# Patient Record
(demographics unavailable — no encounter records)

---

## 2024-10-15 NOTE — HISTORY OF PRESENT ILLNESS
[Breast milk] : breast milk [Normal] : Normal [In Bassinet/Crib] : sleeps in bassinet/crib [de-identified] : patient here for wcc [de-identified] : ithcy from eczema  [de-identified] : astart solids  food allergy testing moderate eczema

## 2024-10-15 NOTE — DEVELOPMENTAL MILESTONES
[Normal Development] : Normal Development [None] : none [Pats or smiles at reflection] : pats or smiles at reflection [Begins to turn when name called] : begins to turn when name called [Babbles] : babbles [Rolls over prone to supine] : rolls over prone to supine [Sits briefly without support] : sits briefly without support [Reaches for object and transfers] : reaches for object and transfers [Rakes small object with 4 fingers] : rakes small object with 4 fingers [[place] small object on surface] : bangs small object on surface [Passed] : passed

## 2024-10-15 NOTE — DISCUSSION/SUMMARY
[Normal Growth] : growth [Normal Development] : development [None] : No medical problems [No Elimination Concerns] : elimination [No Feeding Concerns] : feeding [No Skin Concerns] : skin [Normal Sleep Pattern] : sleep [Term Infant] : Term infant [Family Functioning] : family functioning [Nutrition and Feeding] : nutrition and feeding [Infant Development] : infant development [Oral Health] : oral health [Safety] : safety [No Medications] : ~He/She~ is not on any medications [Parent/Guardian] : parent/guardian [] : The components of the vaccine(s) to be administered today are listed in the plan of care. The disease(s) for which the vaccine(s) are intended to prevent and the risks have been discussed with the caretaker.  The risks are also included in the appropriate vaccination information statements which have been provided to the patient's caregiver.  The caregiver has given consent to vaccinate. [FreeTextEntry1] : Recommend breastfeeding, 8-12 feedings per day. If formula is needed, 2-4 oz every 3-4 hrs. Introduce single-ingredient foods rich in iron, one at a time. Incorporate up to 4 oz of flourinated water daily in a sippy cup. When teeth erupt wipe daily with washcloth. When in car, patient should be in rear-facing car seat in back seat. Put baby to sleep on back, in own crib with no loose or soft bedding. Lower crib matress. Help baby to maintain sleep and feeding routines. May offer pacifier if needed. Continue tummy time when awake. Ensure home is safe since baby is now more mobile. Do not use infant walker. Read aloud to baby. vaccines given eczema reviewed food allergy panel done  no rsv available  return fro more vaccines one month and check up three months

## 2024-10-15 NOTE — PHYSICAL EXAM
[Alert] : alert [Normocephalic] : normocephalic [Flat Open Anterior [place]] : flat open anterior fontanelle [Red Reflex] : red reflex bilateral [PERRL] : PERRL [Normally Placed Ears] : normally placed ears [Auricles Well Formed] : auricles well formed [Clear Tympanic membranes] : clear tympanic membranes [Light reflex present] : light reflex present [Bony landmarks visible] : bony landmarks visible [Nares Patent] : nares patent [Palate Intact] : palate intact [Uvula Midline] : uvula midline [Supple, full passive range of motion] : supple, full passive range of motion [Symmetric Chest Rise] : symmetric chest rise [Clear to Auscultation Bilaterally] : clear to auscultation bilaterally [Regular Rate and Rhythm] : regular rate and rhythm [S1, S2 present] : S1, S2 present [+2 Femoral Pulses] : (+) 2 femoral pulses [Soft] : soft [Bowel Sounds] : bowel sounds present [Central Urethral Opening] : central urethral opening [Testicles Descended] : testicles descended bilaterally [Patent] : patent [Normally Placed] : normally placed [No Abnormal Lymph Nodes Palpated] : no abnormal lymph nodes palpated [Symmetric Buttocks Creases] : symmetric buttocks creases [Plantar Grasp] : plantar grasp reflex present [Cranial Nerves Grossly Intact] : cranial nerves grossly intact [Acute Distress] : no acute distress [Discharge] : no discharge [Tooth Eruption] : no tooth eruption [Palpable Masses] : no palpable masses [Murmurs] : no murmurs [Tender] : nontender [Distended] : nondistended [Hepatomegaly] : no hepatomegaly [Splenomegaly] : no splenomegaly [Back-Ortolani] : negative Back-Ortolani [Allis Sign] : negative Allis sign [Spinal Dimple] : no spinal dimple [Tuft of Hair] : no tuft of hair [Rash or Lesions] : no rash/lesions

## 2024-11-28 NOTE — ASSESSMENT
[Use of independent historian: [ enter independent historian's relationship to patient ] :____] : As the patient was unable to provide a complete and reliable history, I obtained clinical history from the patient's [unfilled] [FreeTextEntry1] : # Atopic dermatitis, flared with #xerosis discussed nature, chronicity and unpredictable course Reviewed dry skin care, including bathing routines, emollients, and fragrance-free products. - Advised moisturizing w/ plain Vaseline. - start triamcinolone 0.1% ointment BID to AA on body PRN roughness.  - start fluocinolone oil to AA on scalp, face PRN roughness.  - Reviewed SE of topical steroids including skin thinning and discoloration and discussed avoidance of prolonged use. - start dilute bleach baths. Instructions provided. - start wet wraps qHS. Instructions provided.   RTC 3 mo

## 2024-11-28 NOTE — CONSULT LETTER
[Dear  ___] : Dear  [unfilled], [Consult Letter:] : I had the pleasure of evaluating your patient, [unfilled]. [Please see my note below.] : Please see my note below. [Consult Closing:] : Thank you very much for allowing me to participate in the care of this patient.  If you have any questions, please do not hesitate to contact me. [Sincerely,] : Sincerely, [FreeTextEntry3] : Eulalia Nina MD Department of Dermatology [facility]

## 2024-11-28 NOTE — HISTORY OF PRESENT ILLNESS
[FreeTextEntry1] : np rash [de-identified] : Referred by: Dr. Flora Ruiz   Mr. JORGE LOREDO  is a 7 month old M here for evaluation of below  #scalp dry and itchy x months. using aquaphor ad taylor jerry. reluctant to use HC a lot. has eczema on body - using HC 2.5% on body and cheeks follows with allergy  S: unsure D: unsure no FHx of eczema    no personal or family h/o skin cancer

## 2024-11-28 NOTE — CONSULT LETTER
[Dear  ___] : Dear  [unfilled], [Consult Letter:] : I had the pleasure of evaluating your patient, [unfilled]. [Please see my note below.] : Please see my note below. [Consult Closing:] : Thank you very much for allowing me to participate in the care of this patient.  If you have any questions, please do not hesitate to contact me. [Sincerely,] : Sincerely, [FreeTextEntry3] : Eulalia Nina MD Department of Dermatology [facility]

## 2024-11-28 NOTE — PHYSICAL EXAM
[Alert] : alert [FreeTextEntry3] : eczematous patches and excoriations on scalp , cheeks, diffusely on arms, trunk, some on legs sparing diaper area

## 2024-11-28 NOTE — HISTORY OF PRESENT ILLNESS
[FreeTextEntry1] : np rash [de-identified] : Referred by: Dr. Flora Ruiz   Mr. JORGE LOREDO  is a 7 month old M here for evaluation of below  #scalp dry and itchy x months. using aquaphor ad taylor jerry. reluctant to use HC a lot. has eczema on body - using HC 2.5% on body and cheeks follows with allergy  S: unsure D: unsure no FHx of eczema    no personal or family h/o skin cancer

## 2025-01-06 NOTE — PHYSICAL EXAM
[Clear] : right tympanic membrane clear [Erythema] : erythema [Mucoid Discharge] : mucoid discharge [Transmitted Upper Airway Sounds] : transmitted upper airway sounds [NL] : normotonic [de-identified] : with erythema on face and body as well as cradle cap.

## 2025-01-06 NOTE — DISCUSSION/SUMMARY
[FreeTextEntry1] :  Eight month old male with prolonged URI/Viral Illness and now with Left Acute Otitis. Will send RVP to lab to evaluate source. Will start on Amoxil 240mg bid X 10 days. Continue with regimen for the atopic dermatitis given by dermatologist.

## 2025-01-06 NOTE — HISTORY OF PRESENT ILLNESS
[FreeTextEntry6] :  Eight month old male with runny nose/congestion since New Years Day. Has had low grade temp and cough over the weekend and has been tugging on his left ear a lot. Appetite decreased but still nursing.

## 2025-01-21 NOTE — HISTORY OF PRESENT ILLNESS
[Well-balanced] : well-balanced [Normal] : Normal [No] : No cigarette smoke exposure [Water heater temperature set at <120 degrees F] : Water heater temperature set at <120 degrees F [Rear facing car seat in  back seat] : Rear facing car seat in  back seat [Carbon Monoxide Detectors] : Carbon monoxide detectors [Smoke Detectors] : Smoke detectors [Mother] : mother [In Crib] : sleeps in crib [Up to date] : Up to date [FreeTextEntry7] :  Patient is here for wcc. [de-identified] : taking a bottle and supplemnt with formula  6 ounces of formula a day and then breast milk feeding food four times a day  [FreeTextEntry1] : bottle first takes maybe two ounces and then breast for 10 minutes 4-6 ounces total when pumping  pooping well  sleeping well whole night 8pm- 7 am   seen by allergy- soy sesame tree nut peanut has epipen  will follow up at three months  eczema doing well- bleach bath every few days  and has ointments

## 2025-01-21 NOTE — PHYSICAL EXAM
[Alert] : alert [Normocephalic] : normocephalic [Flat Open Anterior [place]] : flat open anterior fontanelle [Red Reflex] : red reflex bilateral [PERRL] : PERRL [Normally Placed Ears] : normally placed ears [Auricles Well Formed] : auricles well formed [Clear Tympanic membranes] : clear tympanic membranes [Light reflex present] : light reflex present [Bony landmarks visible] : bony landmarks visible [Nares Patent] : nares patent [Palate Intact] : palate intact [Uvula Midline] : uvula midline [Supple, full passive range of motion] : supple, full passive range of motion [Symmetric Chest Rise] : symmetric chest rise [Clear to Auscultation Bilaterally] : clear to auscultation bilaterally [Regular Rate and Rhythm] : regular rate and rhythm [S1, S2 present] : S1, S2 present [+2 Femoral Pulses] : (+) 2 femoral pulses [Soft] : soft [Bowel Sounds] : bowel sounds present [Central Urethral Opening] : central urethral opening [Testicles Descended] : testicles descended bilaterally [No Abnormal Lymph Nodes Palpated] : no abnormal lymph nodes palpated [Symmetric Abduction and Rotation of hips] : symmetric abduction and rotation of hips [Straight] : straight [Cranial Nerves Grossly Intact] : cranial nerves grossly intact [Acute Distress] : no acute distress [Excessive Tearing] : no excessive tearing [Discharge] : no discharge [Palpable Masses] : no palpable masses [Murmurs] : no murmurs [Tender] : nontender [Distended] : nondistended [Hepatomegaly] : no hepatomegaly [Splenomegaly] : no splenomegaly [Allis Sign] : negative Allis sign [Rash or Lesions] : rash and/or lesion present [de-identified] : eczema on face

## 2025-01-21 NOTE — DISCUSSION/SUMMARY
[Normal Growth] : growth [Normal Development] : development [None] : No known medical problems [No Elimination Concerns] : elimination [No Feeding Concerns] : feeding [No Skin Concerns] : skin [Normal Sleep Pattern] : sleep [Family Adaptation] : family adaptation [Infant [place]] : infant independence [Feeding Routine] : feeding routine [Safety] : safety [No Medications] : ~He/She~ is not on any medications [Parent/Guardian] : parent/guardian [] : The components of the vaccine(s) to be administered today are listed in the plan of care. The disease(s) for which the vaccine(s) are intended to prevent and the risks have been discussed with the caretaker.  The risks are also included in the appropriate vaccination information statements which have been provided to the patient's caregiver.  The caregiver has given consent to vaccinate. [FreeTextEntry1] : Continue breastmilk or formula as desired. Increase table foods, 3 meals with 2-3 snacks per day. Incorporate up to 6 oz of flourinated water daily in a sippy cup. Discussed weaning of bottle and pacifier. Wipe teeth daily with washcloth. When in car, patient should be in rear-facing car seat in back seat. Put baby to sleep in own crib with no loose or soft bedding. Lower crib matress. Help baby to maintain consistent daily routines and sleep schedule. Recognize stranger anxiety. Ensure home is safe since baby is increasingly mobile. Be within arm's reach of baby at all times. Use consistent, positive discipline. Avoid screen time. Read aloud to baby. vaccine given  follow up three months for check up and one month weight check ftt possible  ezceam better control

## 2025-01-21 NOTE — DEVELOPMENTAL MILESTONES
[Normal Development] : Normal Development [None] : none [Uses basic gestures] : uses basic gestures [Says "Chase" or "Mama"] : says "Chase" or "Mama" nonspecifically [Sits well without support] : sits well without support [Transitions between sitting and lying] : transitions between sitting and lying [Balances on hands and knees] : balances on hands and knees [Crawls] : crawls [FreeTextEntry1] : plateau for a while according to mom

## 2025-02-06 NOTE — DISCUSSION/SUMMARY
[FreeTextEntry1] : failure to thrive work up started  consider to gi for eoe work up if negative tymp fluid resolving  some fluid still there on exam   follow up with gi adn then fo check up depending on blood work

## 2025-02-06 NOTE — PHYSICAL EXAM
[NL] : normotonic [Clear] : right tympanic membrane not clear [Clear Effusion] : clear effusion [de-identified] : ezceam around mouth moderate

## 2025-02-06 NOTE — HISTORY OF PRESENT ILLNESS
[de-identified] : patient is here for weight check and blood work [FreeTextEntry6] : history of failure to thrive  also here for follow up ear infection  patient is eating but also stooling after every feed so mom concerned about absorption  also not gaining weight still

## 2025-04-02 NOTE — PHYSICAL EXAM
[Well Developed] : well developed [NAD] : in no acute distress [PERRL] : pupils were equal, round, reactive to light  [Moist & Pink Mucous Membranes] : moist and pink mucous membranes [CTAB] : lungs clear to auscultation bilaterally [Regular Rate and Rhythm] : regular rate and rhythm [Normal S1, S2] : normal S1 and S2 [Soft] : soft  [Normal Bowel Sounds] : normal bowel sounds [No HSM] : no hepatosplenomegaly appreciated [Normal rectal exam] : exam was normal [Normal Tone] : normal tone [Well-Perfused] : well-perfused [Eczema] : eczema [Interactive] : interactive [icteric] : anicteric [Respiratory Distress] : no respiratory distress  [Distended] : non distended [Tender] : non tender [Edema] : no edema [Cyanosis] : no cyanosis [Rash] : no rash [Jaundice] : no jaundice [de-identified] : no diaper rash

## 2025-04-02 NOTE — HISTORY OF PRESENT ILLNESS
[de-identified] : has gained 11 g/day has been eating, avocado, vegetables, chicken, toast - will eat well has food allergy concerns, seeing allergist  has eczema on face, body rash is better not breast feeding as much, now gets similac RTF 11-12 oz formula per day takes 6-9 oz water per day BM 2-3 times per day, formed, no blood many wet diapers developing well  25 reason for visit is poor weight gain FT, , no complications, passed BM in first day of life   exclusively, developing well started solids at 6 months at 7 months there was a decline in weight gain now breastfeeding 4-5 times per day, maternal diet not restricted eats solid well, good quantity has eczema, sees allergist, has blood test positive for food allergies, recommended to reassess at 1 year old has gained almost 2 lbs in last 5 weeks, (~22 g/day) mom says he has been eating better since recovering from the flu has tested +gliadin IgG 23.4, IgA neg, also nl TTG IgA/IgG no family history of celiac disease, IBD, Crohn disease, Ulcerative Colitis  BM 5-6 times per day, pudding, no mucus, no blood, no oil, no diaper rash

## 2025-04-02 NOTE — ASSESSMENT
[FreeTextEntry1] :  Brian has had some trouble gaining weight, but continues to gain weight slowly. His stool is frequent, but without diaper rash, not loose, no foul smell, and no blood. We discussed that his celiac screen should be re-evaluated and include genetics and if suspicion persists, an endoscopy should be considered. The following was recommended: - send blood tests as ordered  - add formula powder to RTF similac, 3 tsp powder to 4 oz similac RTF - continue to give high calorie foods - In order to safely and effectively implement the dietary intervention, follow up with our dietician team in about 1-3 weeks was recommended to assess calorie intake and optimize regiment as needed. - I asked for him to return to office for follow up within 2 months after blood is drawn to reassess, can be seen by ACP as well.  Family should call sooner if clinically indicated. Mom was reassured and satisfied with the plan.  Family should call sooner if clinically indicated.

## 2025-04-02 NOTE — CONSULT LETTER
[Dear  ___] : Dear  [unfilled], [Consult Letter:] : I had the pleasure of evaluating your patient, [unfilled]. [Please see my note below.] : Please see my note below. [Consult Closing:] : Thank you very much for allowing me to participate in the care of this patient.  If you have any questions, please do not hesitate to contact me. [Sincerely,] : Sincerely, [FreeTextEntry3] : Alessio Hollins MD MSc  Director, Pediatric Endoscopy Pediatric Gastroenterology and Nutrition [facility] [employer] [facility]  Division of Pediatric Gastroenterology and Nutrition  [address], [address]  [place] [address]  [phone]

## 2025-04-15 NOTE — HISTORY OF PRESENT ILLNESS
[FreeTextEntry1] : Nutritionist intake: Brian Love) is a 12-month-old who was referred to Dr. Hollins for poor weight gain. Dr. Hollins referred to nutrition to assess caloric intake and optimize nutrition intake.   History according to Dr. Hollins: FT, , no complications, passed BM in first day of life  exclusively, developing well started solids at 6 months at 7 months there was a decline in weight gain then breastfeeding 4-5 times per day, maternal diet not restricted eats solid well, good quantity has eczema, sees allergist, has blood test positive for food allergies, recommended to reassess at 1 year old has gained almost 2 lbs in last 5 weeks, (~22 g/day) mom says he has been eating better since recovering from the flu has tested +gliadin IgG 23.4, IgA neg, also nl TTG IgA/IgG no family history of celiac disease, IBD, Crohn disease, Ulcerative Colitis  Weight (none today due to telehealth): : 8.04kg (6th %tile weight/age), : 7.5kg (4th %tile weight/age) Has 1 year visit tomorrow at Pediatrician's office  Diet history: MOC reports he is fussier than usual-taking less formula and less solids. Feels it may be related to teething, but she is not sure. He is trying more textures, soft foods, less purees. He is starting to want to eat more finger foods but MOC thinks he eats less of these than he does of purees. MO stopped BF 3 to 4 weeks ago although he was only nursing 1 time/day in the am for comfort. He takes 4, 4 oz bottles of Similac RTF with 3 teaspoons of powder added to every 4 ounces (~30 esther/oz) via a bottle.  Breakfast: (no bottle in am); 1/2 mashed banana, baby oatmeal, formula powder, fruit pouch (looks for high calorie ones) before nap-4 oz bottle Lunch: a grain like couscous or brown rice mixed with mashed sweet potatoes and ground chicken or turkey. Mixes 1/2 Serenity pouch (meat type) before nap-4 oz bottle after nap: mashed banana with mashed avocado D: same as lunch, just started chickpea pasta, broccoli as well before bed: 4 oz bottle  drinks water via a sippy cup Similac ~30 esther/oz 4 oz 4x/day = 480 kcal (60 kcal/kg) plus solids  like multigrain toast (mom adds butter), doesn't like plain cheese but has eaten little grilled cheese  BMS have improved-less frequency (feels that may be related to eating less)- not watery, he is not gassy.   Sees allergist. allergic to egg, sesame, soy, peanut, tree nut

## 2025-04-15 NOTE — REASON FOR VISIT
[Home] : at home, [unfilled] , at the time of the visit. [Medical Office: ([facility])___] : at the medical office located in  [Telehealth (audio & video)] : This visit was provided via telehealth using real-time 2-way audio visual technology. [Initial Eval - Existing Diagnosis] : an initial evaluation of an existing diagnosis [Mother] : mother [FreeTextEntry3] : mother

## 2025-04-15 NOTE — REASON FOR VISIT
[Home] : at home, [unfilled] , at the time of the visit. [Medical Office: ([facility])___] : at the medical office located in  [Telehealth (audio & video)] : This visit was provided via telehealth using real-time 2-way audio visual technology. [Initial Eval - Existing Diagnosis] : an initial evaluation of an existing diagnosis [Mother] : mother [FreeTextEntry3] : mother

## 2025-04-16 NOTE — DISCUSSION/SUMMARY
[Normal Growth] : growth [Normal Development] : development [None] : No known medical problems [No Elimination Concerns] : elimination [No Feeding Concerns] : feeding [No Skin Concerns] : skin [Normal Sleep Pattern] : sleep [Family Support] : family support [Establishing Routines] : establishing routines [Feeding and Appetite Changes] : feeding and appetite changes [Establishing A Dental Home] : establishing a dental home [Safety] : safety [No Medications] : ~He/She~ is not on any medications [Parent/Guardian] : parent/guardian [FreeTextEntry1] : Transition to whole cow's milk. Continue table foods, 3 meals with 2-3 snacks per day. Incorporate up to 6 oz of flourinated water daily in a sippy cup. Brush teeth twice a day with soft toothbrush. Recommend visit to dentist. When in car, keep child in rear-facing car seats until age 2, or until  the maximum height and weight for seat is reached. Put baby to sleep in own crib with no loose or soft bedding. Lower crib matress. Help baby to maintain consistent daily routines and sleep schedule. Recognize stranger and separation anxiety. Ensure home is safe since baby is increasingly mobile. Be within arm's reach of baby at all times. Use consistent, positive discipline. Avoid screen time. Read aloud to baby. follow up three months  and to start day care in july  eczema

## 2025-04-16 NOTE — PHYSICAL EXAM
[Alert] : alert [Normocephalic] : normocephalic [Closed Anterior [place]] : closed anterior fontanelle [Red Reflex] : red reflex bilateral [PERRL] : PERRL [Normally Placed Ears] : normally placed ears [Auricles Well Formed] : auricles well formed [Clear Tympanic membranes] : clear tympanic membranes [Light reflex present] : light reflex present [Bony landmarks visible] : bony landmarks visible [Nares Patent] : nares patent [Palate Intact] : palate intact [Uvula Midline] : uvula midline [Tooth Eruption] : tooth eruption [Supple, full passive range of motion] : supple, full passive range of motion [Symmetric Chest Rise] : symmetric chest rise [Clear to Auscultation Bilaterally] : clear to auscultation bilaterally [Regular Rate and Rhythm] : regular rate and rhythm [S1, S2 present] : S1, S2 present [+2 Femoral Pulses] : (+) 2 femoral pulses [Soft] : soft [Bowel Sounds] : normoactive bowel sounds [Central Urethral Opening] : central urethral opening [Testicles Descended] : testicles descended bilaterally [No Abnormal Lymph Nodes Palpated] : no abnormal lymph nodes palpated [Symmetric Abduction and Rotation of Hips] : symmetric abduction and rotation of hips [Straight] : straight [Cranial Nerves Grossly Intact] : cranial nerves grossly intact [Discharge] : no discharge [Palpable Masses] : no palpable masses [Murmurs] : no murmurs [Tender] : nontender [Distended] : nondistended [Hepatomegaly] : no hepatomegaly [Splenomegaly] : no splenomegaly [Allis Sign] : negative Allis sign [Rash or Lesions] : no rash/lesions [de-identified] : crusting rash and erythema on face

## 2025-04-16 NOTE — DEVELOPMENTAL MILESTONES
[Normal Development] : Normal Development [None] : none [Looks for hidden objects] : looks for hidden objects [Imitates new gestures] : imitates new gestures [Says "Dad" or "Mom" with meaning] : says "Dad" or "Mom" with meaning [Takes first independent] : takes first independent steps [Stands without support] : stands without support [Drops object in a cup] : drops object in a cup [Picks up small object with 2 finger] : picks up small object with 2 finger pincer grasp [Picks up food and eats it] : picks up food and eats it [Uses one word other than Mom or] : does not use one word other than Mom or Dad or personal names

## 2025-04-16 NOTE — HISTORY OF PRESENT ILLNESS
[Mother] : mother [Formula ___ oz/feed] : [unfilled] oz of formula per feed [Table food] : table food [Normal] : Normal [Up to date] : Up to date [FreeTextEntry7] :  Patient is here for wcc. [FreeTextEntry1] : allergist  seen as well  need follow up recommend to continue formula fortified now not finishing bottles but may be haivng issues ortherwise  using triamcinolone

## 2025-06-26 NOTE — PHYSICAL EXAM
[Well Developed] : well developed [NAD] : in no acute distress [PERRL] : pupils were equal, round, reactive to light  [Moist & Pink Mucous Membranes] : moist and pink mucous membranes [CTAB] : lungs clear to auscultation bilaterally [Regular Rate and Rhythm] : regular rate and rhythm [Normal S1, S2] : normal S1 and S2 [Soft] : soft  [Normal Bowel Sounds] : normal bowel sounds [No HSM] : no hepatosplenomegaly appreciated [Normal Tone] : normal tone [Well-Perfused] : well-perfused [Eczema] : eczema [Interactive] : interactive [icteric] : anicteric [Respiratory Distress] : no respiratory distress  [Distended] : non distended [Tender] : non tender [Rectal Exam Deferred] : rectal exam was deferred [Edema] : no edema [Cyanosis] : no cyanosis [Rash] : no rash [Jaundice] : no jaundice [FreeTextEntry1] : Happy baby, active and ambulatory in office

## 2025-06-26 NOTE — HISTORY OF PRESENT ILLNESS
[de-identified] : 14 month old male with history of poor weight gain here for follow up visit.   Brian was last seen by Dr. Hollins in April 2025. Continues to gain weight slowly. His stool is frequent, but without diaper rash, not loose, no foul smell, and no blood. We discussed that his celiac screen should be re-evaluated and include genetics and if suspicion persists, an endoscopy should be considered. Recommended adding formula powder to RTF Similac, 3 tsp powder to 4 oz Similac RTF. Continue to give high calorie foods.  April 2025 CBC, CMP, ESR, CRP, thyroid and celiac screen normal.   Brian comes in today for follow up visit. He is taking Pediasure 2 cans a day. He is also eating all textures of table food. He has gained great weight since his last visit. He normally enjoys eating, no vomiting- for past 2 weeks he has decreased the variety in his diet, preferring snack chips, pretzels and smoothies. He has also had 2 episodes of NB/NB emesis. He is being followed by allergist (eczema) and is avoiding egg, peanuts, tree nuts and sesame for positive serum and skin testing that was positive. He has gained great weight since his last visit.

## 2025-06-26 NOTE — CONSULT LETTER
[Dear  ___] : Dear  [unfilled], [Consult Letter:] : I had the pleasure of evaluating your patient, [unfilled]. [Please see my note below.] : Please see my note below. [Consult Closing:] : Thank you very much for allowing me to participate in the care of this patient.  If you have any questions, please do not hesitate to contact me. [Sincerely,] : Sincerely, [FreeTextEntry3] : Alessio Hollins MD MSc  Director, Pediatric Endoscopy Pediatric Gastroenterology and Nutrition [facility] [employer] [facility] and [facility]  Division of Pediatric Gastroenterology and Nutrition  [address], [address]  [place] [address]  [phone]

## 2025-06-26 NOTE — ASSESSMENT
[Educated Patient & Family about Diagnosis] : educated the patient and family about the diagnosis [FreeTextEntry1] : 14 month old male with eczema and newly diagnosed food allergies with recent history of poor weight gain. April 2025 CBC, CMP, ESR, CRP, thyroid and celiac screen normal.  Brian has excellent weight gain since his last visit - supplementing his diet with Pediasure 2 cans a day. Mom concerned over recent change in his diet and 2 recent episodes of NB/NB emesis. Discussed with mom scheduling EGD for ~ 6-8 weeks-with eczema and food allergies and more recent symptoms would consider peptic/allergic process. If no further episodes of emesis and weight gain adequate at next appointment will hold off.  -Continue Pediasure 2 cans a day -follow up office visit in 4-6 weeks- if symptoms persist or worsen EGD

## 2025-07-16 NOTE — PHYSICAL EXAM
[Alert] : alert [No Acute Distress] : no acute distress [Normocephalic] : normocephalic [Anterior Spencer Closed] : anterior fontanelle closed [Red Reflex Bilateral] : red reflex bilateral [PERRL] : PERRL [Normally Placed Ears] : normally placed ears [Auricles Well Formed] : auricles well formed [Clear Tympanic membranes with present light reflex and bony landmarks] : clear tympanic membranes with present light reflex and bony landmarks [No Discharge] : no discharge [Nares Patent] : nares patent [Palate Intact] : palate intact [Uvula Midline] : uvula midline [Tooth Eruption] : tooth eruption  [Supple, full passive range of motion] : supple, full passive range of motion [No Palpable Masses] : no palpable masses [Symmetric Chest Rise] : symmetric chest rise [Clear to Auscultation Bilaterally] : clear to auscultation bilaterally [Regular Rate and Rhythm] : regular rate and rhythm [S1, S2 present] : S1, S2 present [No Murmurs] : no murmurs [+2 Femoral Pulses] : +2 femoral pulses [Soft] : soft [NonTender] : non tender [Non Distended] : non distended [Normoactive Bowel Sounds] : normoactive bowel sounds [No Hepatomegaly] : no hepatomegaly [No Splenomegaly] : no splenomegaly [Central Urethral Opening] : central urethral opening [Testicles Descended Bilaterally] : testicles descended bilaterally [Patent] : patent [Normally Placed] : normally placed [No Abnormal Lymph Nodes Palpated] : no abnormal lymph nodes palpated [No Clavicular Crepitus] : no clavicular crepitus [Negative Back-Ortalani] : negative Back-Ortalani [Symmetric Buttocks Creases] : symmetric buttocks creases [No Spinal Dimple] : no spinal dimple [NoTuft of Hair] : no tuft of hair [Cranial Nerves Grossly Intact] : cranial nerves grossly intact [No Rash or Lesions] : no rash or lesions

## 2025-07-16 NOTE — HISTORY OF PRESENT ILLNESS
[Normal] : Normal [No] : No cigarette smoke exposure [Water heater temperature set at <120 degrees F] : Water heater temperature set at <120 degrees F [Car seat in back seat] : Car seat in back seat [Carbon Monoxide Detectors] : Carbon monoxide detectors [Smoke Detectors] : Smoke detectors [Up to date] : Up to date [FreeTextEntry7] :  Patient is here for wcc.  [de-identified] : started day care 2 weeks ago and now worse eating [FreeTextEntry1] : 1-2 cans a day  poor appetite not swallowing big pieces chopped real small according to dad and is missing meals no vomiting this week had fever last week  follow next week ? follow up allergy

## 2025-07-16 NOTE — DISCUSSION/SUMMARY
[Normal Growth] : growth [Normal Development] : development [None] : No known medical problems [No Elimination Concerns] : elimination [No Feeding Concerns] : feeding [No Skin Concerns] : skin [Normal Sleep Pattern] : sleep [Communication and Social Development] : communication and social development [Sleep Routines and Issues] : sleep routines and issues [Temper Tantrums and Discipline] : temper tantrums and discipline [Healthy Teeth] : healthy teeth [Safety] : safety [No Medications] : ~He/She~ is not on any medications [Parent/Guardian] : parent/guardian [] : The components of the vaccine(s) to be administered today are listed in the plan of care. The disease(s) for which the vaccine(s) are intended to prevent and the risks have been discussed with the caretaker.  The risks are also included in the appropriate vaccination information statements which have been provided to the patient's caregiver.  The caregiver has given consent to vaccinate. [FreeTextEntry1] : Continue whole cow's milk. Continue table foods, 3 meals with 2-3 snacks per day. Incorporate flourinated water daily in a sippy cup. Brush teeth twice a day with soft toothbrush. Recommend visit to dentist. When in car, keep child in rear-facing car seats until age 2, or until  the maximum height and weight for seat is reached. Put baby to sleep in own crib. Lower crib matress. Help baby to maintain consistent daily routines and sleep schedule. Recognize stranger and separation anxiety. Ensure home is safe since baby is increasingly mobile. Be within arm's reach of baby at all times. Use consistent, positive discipline. Read aloud to baby.  vaccine given follow up wcc 3 ,months refer to gi for scope

## 2025-07-29 NOTE — CONSULT LETTER
[Dear  ___] : Dear  [unfilled], [Consult Letter:] : I had the pleasure of evaluating your patient, [unfilled]. [Please see my note below.] : Please see my note below. [Consult Closing:] : Thank you very much for allowing me to participate in the care of this patient.  If you have any questions, please do not hesitate to contact me. [Sincerely,] : Sincerely, [FreeTextEntry3] : Ana Vallejo [facility] Pediatric Gastroenterology, Liver Disease and Nutrition [facility] [phone]

## 2025-07-29 NOTE — CONSULT LETTER
[Dear  ___] : Dear  [unfilled], [Consult Letter:] : I had the pleasure of evaluating your patient, [unfilled]. [Please see my note below.] : Please see my note below. [Consult Closing:] : Thank you very much for allowing me to participate in the care of this patient.  If you have any questions, please do not hesitate to contact me. [Sincerely,] : Sincerely, [FreeTextEntry3] : Ana Vallejo [facility] Pediatric Gastroenterology, Liver Disease and Nutrition [facility] [phone]

## 2025-07-29 NOTE — PHYSICAL EXAM
[Well Developed] : well developed [NAD] : in no acute distress [PERRL] : pupils were equal, round, reactive to light  [Moist & Pink Mucous Membranes] : moist and pink mucous membranes [CTAB] : lungs clear to auscultation bilaterally [Regular Rate and Rhythm] : regular rate and rhythm [Normal S1, S2] : normal S1 and S2 [Soft] : soft  [Normal Bowel Sounds] : normal bowel sounds [No HSM] : no hepatosplenomegaly appreciated [Rectal Exam Deferred] : rectal exam was deferred [Normal Tone] : normal tone [Well-Perfused] : well-perfused [Eczema] : eczema [Interactive] : interactive [icteric] : anicteric [Respiratory Distress] : no respiratory distress  [Distended] : non distended [Tender] : non tender [Edema] : no edema [Cyanosis] : no cyanosis [Rash] : no rash [Jaundice] : no jaundice [FreeTextEntry1] : Happy baby, active and ambulatory in office

## 2025-07-29 NOTE — HISTORY OF PRESENT ILLNESS
[de-identified] : 15 month old male with history of poor weight gain here for follow up visit.   Brian was last seen by Dr. Hollins in April 2025.  April 2025 CBC, CMP, ESR, CRP, thyroid and celiac screen normal. Brian has excellent weight gain since his last visit - supplementing his diet with Pediasure 2 cans a day. Mom concerned over recent change in his diet and 2 recent episodes of NB/NB emesis. Discussed with mom scheduling EGD for ~ 6-8 weeks-with eczema and food allergies and more recent symptoms would consider peptic/allergic process. If no further episodes of emesis and weight gain adequate at next appointment will hold off.   Brian comes in today for follow up visit. He is taking Pediasure 2 cans a day. He is also eating all textures of table food. he does pocket his food often, no vomiting. He has gained weight since his last visit.   Eczema responsive to topical ointments- seems to be well controlled at this time.   Allergy evaluation ~4 months ago - blood and skin testing c/w allergies to nuts, eggs, sesame- parents restrict.   BM's are QD-BID, soft, mushy.  No gross blood.

## 2025-07-29 NOTE — ASSESSMENT
[Educated Patient & Family about Diagnosis] : educated the patient and family about the diagnosis [FreeTextEntry1] : 15 month old male with eczema and newly diagnosed food allergies with history of poor weight gain. April 2025 CBC, CMP, ESR, CRP, thyroid and celiac screen normal.  Brian gained weight since his last visit, however deceleration in rate of gain.  Parents anxious and would like further evaluation.  PLAN: -Continue Pediasure 2 cans a day -EGD -follow up office visit in 4-6 weeks- if symptoms persist or worsen EGD

## 2025-07-29 NOTE — HISTORY OF PRESENT ILLNESS
[de-identified] : 15 month old male with history of poor weight gain here for follow up visit.   Brian was last seen by Dr. Hollins in April 2025.  April 2025 CBC, CMP, ESR, CRP, thyroid and celiac screen normal. Brian has excellent weight gain since his last visit - supplementing his diet with Pediasure 2 cans a day. Mom concerned over recent change in his diet and 2 recent episodes of NB/NB emesis. Discussed with mom scheduling EGD for ~ 6-8 weeks-with eczema and food allergies and more recent symptoms would consider peptic/allergic process. If no further episodes of emesis and weight gain adequate at next appointment will hold off.   Brian comes in today for follow up visit. He is taking Pediasure 2 cans a day. He is also eating all textures of table food. he does pocket his food often, no vomiting. He has gained weight since his last visit.   Eczema responsive to topical ointments- seems to be well controlled at this time.   Allergy evaluation ~4 months ago - blood and skin testing c/w allergies to nuts, eggs, sesame- parents restrict.   BM's are QD-BID, soft, mushy.  No gross blood.